# Patient Record
Sex: FEMALE | Race: WHITE | NOT HISPANIC OR LATINO | Employment: STUDENT | URBAN - METROPOLITAN AREA
[De-identification: names, ages, dates, MRNs, and addresses within clinical notes are randomized per-mention and may not be internally consistent; named-entity substitution may affect disease eponyms.]

---
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
## 2024-02-13 ENCOUNTER — PATIENT OUTREACH (OUTPATIENT)
Dept: CARE COORDINATION | Facility: CLINIC | Age: 19
End: 2024-02-13
Payer: COMMERCIAL

## 2024-02-16 ENCOUNTER — E-VISIT (OUTPATIENT)
Dept: URGENT CARE | Facility: CLINIC | Age: 19
End: 2024-02-16
Payer: COMMERCIAL

## 2024-02-16 DIAGNOSIS — R30.0 DYSURIA: Primary | ICD-10-CM

## 2024-02-16 PROCEDURE — 99421 OL DIG E/M SVC 5-10 MIN: CPT | Performed by: PREVENTIVE MEDICINE

## 2024-02-16 NOTE — PATIENT INSTRUCTIONS
Dear Jesus Vyas,     After reviewing your responses, I would like you to come in for a urine test to make sure we treat you correctly. This urine test is to evaluate you for a possible urinary tract infection, and should be scheduled for today or tomorrow. Schedule a Lab Only appointment here.     Lab appointments are not available at most locations on the weekends. If no Lab Only appointment is available, you should be seen in any of our convenient Walk-in or Urgent Care Centers, which can be found on our website here.     You will receive instructions with your results in [employer] once they are available.     If your symptoms worsen, you develop pain in your back or stomach, develop fevers, or are not improving in 5 days, please contact your primary care provider for an appointment or visit a Walk-in or Urgent Care Center to be seen.     Thanks again for choosing us as your health care partner,     Rakesh Hicks MD, MD  Urinary Tract Infection (UTI) in Women: Care Instructions  Overview     A urinary tract infection, or UTI, is a general term for an infection anywhere between the kidneys and the urethra (where urine comes out). Most UTIs are bladder infections. They often cause pain or burning when you urinate.  UTIs are caused by bacteria and can be cured with antibiotics. Be sure to complete your treatment so that the infection does not get worse.  Follow-up care is a key part of your treatment and safety. Be sure to make and go to all appointments, and call your doctor if you are having problems. It's also a good idea to know your test results and keep a list of the medicines you take.  How can you care for yourself at home?  Take your antibiotics as directed. Do not stop taking them just because you feel better. You need to take the full course of antibiotics.  Drink extra water and other fluids for the next day or two. This will help make the urine less concentrated and help wash out  "the bacteria that are causing the infection. (If you have kidney, heart, or liver disease and have to limit fluids, talk with your doctor before you increase the amount of fluids you drink.)  Avoid drinks that are carbonated or have caffeine. They can irritate the bladder.  Urinate often. Try to empty your bladder each time.  To relieve pain, take a hot bath or lay a heating pad set on low over your lower belly or genital area. Never go to sleep with a heating pad in place.  To prevent UTIs  Drink plenty of water each day. This helps you urinate often, which clears bacteria from your system. (If you have kidney, heart, or liver disease and have to limit fluids, talk with your doctor before you increase the amount of fluids you drink.)  Urinate when you need to.  If you are sexually active, urinate right after you have sex.  Change sanitary pads often.  Avoid douches, bubble baths, feminine hygiene sprays, and other feminine hygiene products that have deodorants.  After going to the bathroom, wipe from front to back.  When should you call for help?   Call your doctor now or seek immediate medical care if:    Symptoms such as fever, chills, nausea, or vomiting get worse or appear for the first time.     You have new pain in your back just below your rib cage. This is called flank pain.     There is new blood or pus in your urine.     You have any problems with your antibiotic medicine.   Watch closely for changes in your health, and be sure to contact your doctor if:    You are not getting better after taking an antibiotic for 2 days.     Your symptoms go away but then come back.   Where can you learn more?  Go to https://www.[employer]net/patiented  Enter K848 in the search box to learn more about \"Urinary Tract Infection (UTI) in Women: Care Instructions.\"  Current as of: February 28, 2023               Content Version: 13.8    5399-9531 [employer].   Care instructions adapted under license by your " healthcare professional. If you have questions about a medical condition or this instruction, always ask your healthcare professional. [employer] disclaims any warranty or liability for your use of this information.

## 2024-02-17 ENCOUNTER — LAB (OUTPATIENT)
Dept: LAB | Facility: CLINIC | Age: 19
End: 2024-02-17
Payer: COMMERCIAL

## 2024-02-17 DIAGNOSIS — R30.0 DYSURIA: Primary | ICD-10-CM

## 2024-02-17 DIAGNOSIS — R30.0 DYSURIA: ICD-10-CM

## 2024-02-17 LAB
ALBUMIN UR-MCNC: NEGATIVE MG/DL
APPEARANCE UR: CLEAR
BACTERIA #/AREA URNS HPF: ABNORMAL /HPF
BILIRUB UR QL STRIP: NEGATIVE
COLOR UR AUTO: YELLOW
GLUCOSE UR STRIP-MCNC: NEGATIVE MG/DL
HGB UR QL STRIP: ABNORMAL
KETONES UR STRIP-MCNC: NEGATIVE MG/DL
LEUKOCYTE ESTERASE UR QL STRIP: ABNORMAL
MUCOUS THREADS #/AREA URNS LPF: PRESENT /LPF
NITRATE UR QL: NEGATIVE
PH UR STRIP: 5.5 [PH] (ref 5–7)
RBC #/AREA URNS AUTO: ABNORMAL /HPF
SP GR UR STRIP: 1.02 (ref 1–1.03)
SQUAMOUS #/AREA URNS AUTO: ABNORMAL /LPF
UROBILINOGEN UR STRIP-ACNC: 0.2 E.U./DL
WBC #/AREA URNS AUTO: ABNORMAL /HPF

## 2024-02-17 PROCEDURE — 81001 URINALYSIS AUTO W/SCOPE: CPT

## 2024-02-17 PROCEDURE — 87086 URINE CULTURE/COLONY COUNT: CPT | Performed by: PHYSICIAN ASSISTANT

## 2024-02-19 LAB — BACTERIA UR CULT: NORMAL

## 2024-05-04 ENCOUNTER — HEALTH MAINTENANCE LETTER (OUTPATIENT)
Age: 19
End: 2024-05-04

## 2024-06-27 ENCOUNTER — TELEPHONE (OUTPATIENT)
Dept: FAMILY MEDICINE | Facility: CLINIC | Age: 19
End: 2024-06-27

## 2024-06-27 NOTE — CONFIDENTIAL NOTE
Patient Quality Outreach    Patient is due for the following:   Physical Preventive Adult Physical  Chlamydia Screening    Next Steps:   Schedule a Adult Preventative    Type of outreach:    Sent [employer] message.      Questions for provider review:    None           Jimbo Cook MA

## 2024-06-27 NOTE — LETTER
[facility]  [address] [address] [phone]       July 11, 2024    Jesus Vyas  [address] [address] [address]    Dear Jesus,    We care about your health and have reviewed your health plan and are making recommendations based on this review, to optimize your health.    You are in particular need of attention regarding:  -Wellness (Physical) Visit   -Chlamydia Screening    We are recommending that you:  -schedule a WELLNESS (Physical) APPOINTMENT with me.   I will check fasting labs the same day - nothing to eat except water and meds for 8-10 hours prior.      -Be tested for Chlamydia      Annual testing is recommended for sexually active women between the ages of 15 and 25.     Chlamydia is the most common bacterial sexually transmitted disease in the [place], according to the Centers for Disease Control (CDC), yet many women considered at risk for the disease do not get the recommended annual screening test.     Chlamydia has no symptoms and left untreated, it can cause infertility and other serious health problems. Chlamydia is easily cured with antibiotics.  We have enclosed a brochure that gives you additional information about Chlamydia.    Testing is now usually done by leaving a urine sample with a lab-only appointment or you can make an office visit if you have other concerns. (If you are not recently or currently sexually active, then please contact us so we can update your medical record.)      In addition, here is a list of due or overdue Health Maintenance reminders.    Health Maintenance Due   Topic Date Due    ANNUAL REVIEW OF HM ORDERS  Never done    Discuss Advance Care Planning  Never done    COVID-19 Vaccine (5 - 2023-24 season) 09/01/2023    PHQ-2 (once per calendar year)  01/01/2024    Yearly Preventive Visit  03/07/2024    Chlamydia Screening  03/07/2024    Diptheria Tetanus Pertussis (DTAP/TDAP/TD) Vaccine (7 - Td or  Tdap) 06/30/2024       To address the above recommendations, we encourage you to contact us at [phone], via [email or web address] or by contacting Central Scheduling toll free at [phone] 24 hours a day. They will assist you with finding the most convenient time and location.    Thank you for trusting [facility] and we appreciate the opportunity to serve you.  We look forward to supporting your healthcare needs in the future.    Healthy Regards,    Your [facility] Team

## 2024-06-27 NOTE — LETTER
[facility]  [address] [address] [address] [phone]       October 10, 2024    Jesus Vyas  [address] [address] [address]    Dear Jesus,    We care about your health and have reviewed your health plan and are making recommendations based on this review, to optimize your health.    You are in particular need of attention regarding:  -Wellness (Physical) Visit   -Chlamydia Screening    We are recommending that you:  -schedule a WELLNESS (Physical) APPOINTMENT with me.   I will check fasting labs the same day - nothing to eat except water and meds for 8-10 hours prior.    -Be tested for Chlamydia      Annual testing is recommended for sexually active women between the ages of 15 and 25.     Chlamydia is the most common bacterial sexually transmitted disease in the [place], according to the Centers for Disease Control (CDC), yet many women considered at risk for the disease do not get the recommended annual screening test.     Chlamydia has no symptoms and left untreated, it can cause infertility and other serious health problems. Chlamydia is easily cured with antibiotics.  We have enclosed a brochure that gives you additional information about Chlamydia.    Testing is now usually done by leaving a urine sample with a lab-only appointment or you can make an office visit if you have other concerns. (If you are not recently or currently sexually active, then please contact us so we can update your medical record.)      In addition, here is a list of due or overdue Health Maintenance reminders.    Health Maintenance Due   Topic Date Due    ANNUAL REVIEW OF HM ORDERS  Never done    Discuss Advance Care Planning  Never done    PHQ-2 (once per calendar year)  01/01/2024    Yearly Preventive Visit  03/07/2024    Chlamydia Screening  03/07/2024    Diptheria Tetanus Pertussis (DTAP/TDAP/TD) Vaccine (7 - Td or Tdap) 06/30/2024    Flu Vaccine (1) 09/01/2024     COVID-19 Vaccine (5 - 2024-25 season) 09/01/2024       To address the above recommendations, we encourage you to contact us at [phone], via [employer] or by contacting Central Scheduling toll free at [phone] 24 hours a day. They will assist you with finding the most convenient time and location.    Thank you for trusting [facility] and we appreciate the opportunity to serve you.  We look forward to supporting your healthcare needs in the future.    Healthy Regards,    Your [facility] Team

## 2024-07-11 NOTE — CONFIDENTIAL NOTE
Patient Quality Outreach    Patient is due for the following:   Physical Preventive Adult Physical  Chlamydia Screening    Next Steps:   Schedule a Adult Preventative    Type of outreach:    Sent letter.    Next Steps:  Reach out within 90 days via Letter.    Max number of attempts reached: No. Will try again in 90 days if patient still on fail list.    Questions for provider review:    None           Jimbo Cook MA

## 2024-08-23 ENCOUNTER — TELEPHONE (OUTPATIENT)
Dept: FAMILY MEDICINE | Facility: CLINIC | Age: 19
End: 2024-08-23

## 2024-08-23 NOTE — TELEPHONE ENCOUNTER
Patient Quality Outreach    Patient is due for the following:   Depression  -  PHQ-9 needed and Depression follow-up visit    Next Steps:   Schedule a office visit for Depression     Type of outreach:    Sent [employer] message.    Next Steps:  Reach out within 90 days via ii4bt.    Max number of attempts reached: No. Will try again in 90 days if patient still on fail list.    Questions for provider review:    None           Rosibel Lopez MA  Chart routed to Care Team.

## 2024-09-03 ENCOUNTER — PATIENT OUTREACH (OUTPATIENT)
Dept: CARE COORDINATION | Facility: CLINIC | Age: 19
End: 2024-09-03
Payer: COMMERCIAL

## 2024-09-13 ENCOUNTER — TRANSFERRED RECORDS (OUTPATIENT)
Dept: HEALTH INFORMATION MANAGEMENT | Facility: CLINIC | Age: 19
End: 2024-09-13
Payer: COMMERCIAL

## 2024-09-23 ENCOUNTER — TRANSFERRED RECORDS (OUTPATIENT)
Dept: HEALTH INFORMATION MANAGEMENT | Facility: CLINIC | Age: 19
End: 2024-09-23

## 2024-10-10 NOTE — CONFIDENTIAL NOTE
Patient Quality Outreach    Patient is due for the following:   Physical Preventive Adult Physical  Chlamydia Screening    Next Steps:   Schedule a Adult Preventative    Type of outreach:    Sent letter.      Questions for provider review:    None           Jimbo Cook MA

## 2025-01-17 PROBLEM — N91.4 SECONDARY OLIGOMENORRHEA: Status: ACTIVE | Noted: 2023-03-13

## 2025-01-27 ENCOUNTER — ANCILLARY PROCEDURE (OUTPATIENT)
Dept: ULTRASOUND IMAGING | Facility: CLINIC | Age: 20
End: 2025-01-27
Attending: STUDENT IN AN ORGANIZED HEALTH CARE EDUCATION/TRAINING PROGRAM
Payer: COMMERCIAL

## 2025-01-27 DIAGNOSIS — N91.4 SECONDARY OLIGOMENORRHEA: ICD-10-CM

## 2025-01-27 PROCEDURE — 76856 US EXAM PELVIC COMPLETE: CPT | Performed by: STUDENT IN AN ORGANIZED HEALTH CARE EDUCATION/TRAINING PROGRAM

## 2025-01-27 PROCEDURE — 76830 TRANSVAGINAL US NON-OB: CPT | Performed by: STUDENT IN AN ORGANIZED HEALTH CARE EDUCATION/TRAINING PROGRAM

## 2025-01-28 PROBLEM — E28.2 PCOS (POLYCYSTIC OVARIAN SYNDROME): Status: ACTIVE | Noted: 2023-03-13
